# Patient Record
Sex: MALE
[De-identification: names, ages, dates, MRNs, and addresses within clinical notes are randomized per-mention and may not be internally consistent; named-entity substitution may affect disease eponyms.]

---

## 2018-08-21 ENCOUNTER — HOSPITAL ENCOUNTER (INPATIENT)
Dept: HOSPITAL 88 - FSED | Age: 38
LOS: 6 days | Discharge: HOME | DRG: 389 | End: 2018-08-27
Attending: INTERNAL MEDICINE | Admitting: INTERNAL MEDICINE
Payer: COMMERCIAL

## 2018-08-21 VITALS — SYSTOLIC BLOOD PRESSURE: 116 MMHG | DIASTOLIC BLOOD PRESSURE: 73 MMHG

## 2018-08-21 VITALS — WEIGHT: 101.13 LBS | HEIGHT: 64 IN | BODY MASS INDEX: 17.26 KG/M2

## 2018-08-21 VITALS — SYSTOLIC BLOOD PRESSURE: 114 MMHG | DIASTOLIC BLOOD PRESSURE: 72 MMHG

## 2018-08-21 VITALS — DIASTOLIC BLOOD PRESSURE: 72 MMHG | SYSTOLIC BLOOD PRESSURE: 114 MMHG

## 2018-08-21 DIAGNOSIS — K59.00: ICD-10-CM

## 2018-08-21 DIAGNOSIS — F06.8: ICD-10-CM

## 2018-08-21 DIAGNOSIS — D50.9: ICD-10-CM

## 2018-08-21 DIAGNOSIS — D63.8: ICD-10-CM

## 2018-08-21 DIAGNOSIS — F84.0: ICD-10-CM

## 2018-08-21 DIAGNOSIS — K56.699: Primary | ICD-10-CM

## 2018-08-21 DIAGNOSIS — K56.7: ICD-10-CM

## 2018-08-21 DIAGNOSIS — R50.9: ICD-10-CM

## 2018-08-21 DIAGNOSIS — G80.9: ICD-10-CM

## 2018-08-21 DIAGNOSIS — R13.10: ICD-10-CM

## 2018-08-21 DIAGNOSIS — G40.909: ICD-10-CM

## 2018-08-21 PROCEDURE — 71045 X-RAY EXAM CHEST 1 VIEW: CPT

## 2018-08-21 PROCEDURE — 87040 BLOOD CULTURE FOR BACTERIA: CPT

## 2018-08-21 PROCEDURE — 82150 ASSAY OF AMYLASE: CPT

## 2018-08-21 PROCEDURE — 82948 REAGENT STRIP/BLOOD GLUCOSE: CPT

## 2018-08-21 PROCEDURE — 74018 RADEX ABDOMEN 1 VIEW: CPT

## 2018-08-21 PROCEDURE — 80053 COMPREHEN METABOLIC PANEL: CPT

## 2018-08-21 PROCEDURE — 36415 COLL VENOUS BLD VENIPUNCTURE: CPT

## 2018-08-21 PROCEDURE — 82607 VITAMIN B-12: CPT

## 2018-08-21 PROCEDURE — 80164 ASSAY DIPROPYLACETIC ACD TOT: CPT

## 2018-08-21 PROCEDURE — 99284 EMERGENCY DEPT VISIT MOD MDM: CPT

## 2018-08-21 PROCEDURE — 82746 ASSAY OF FOLIC ACID SERUM: CPT

## 2018-08-21 PROCEDURE — 80048 BASIC METABOLIC PNL TOTAL CA: CPT

## 2018-08-21 PROCEDURE — 81003 URINALYSIS AUTO W/O SCOPE: CPT

## 2018-08-21 PROCEDURE — 86256 FLUORESCENT ANTIBODY TITER: CPT

## 2018-08-21 PROCEDURE — 82728 ASSAY OF FERRITIN: CPT

## 2018-08-21 PROCEDURE — 83540 ASSAY OF IRON: CPT

## 2018-08-21 PROCEDURE — 83690 ASSAY OF LIPASE: CPT

## 2018-08-21 PROCEDURE — 85025 COMPLETE CBC W/AUTO DIFF WBC: CPT

## 2018-08-21 PROCEDURE — 74176 CT ABD & PELVIS W/O CONTRAST: CPT

## 2018-08-21 PROCEDURE — 83735 ASSAY OF MAGNESIUM: CPT

## 2018-08-21 PROCEDURE — 84466 ASSAY OF TRANSFERRIN: CPT

## 2018-08-21 PROCEDURE — 83516 IMMUNOASSAY NONANTIBODY: CPT

## 2018-08-21 PROCEDURE — 85045 AUTOMATED RETICULOCYTE COUNT: CPT

## 2018-08-21 PROCEDURE — 82784 ASSAY IGA/IGD/IGG/IGM EACH: CPT

## 2018-08-21 RX ADMIN — SODIUM CHLORIDE SCH MLS/HR: 9 INJECTION, SOLUTION INTRAVENOUS at 15:00

## 2018-08-21 RX ADMIN — SODIUM CHLORIDE SCH MLS/HR: 9 INJECTION, SOLUTION INTRAVENOUS at 17:24

## 2018-08-21 NOTE — DIAGNOSTIC IMAGING REPORT
Examination: Single AP view of the chest.



COMPARISON: None.



INDICATION: Cerebral palsy patient with abnormal behavior

     

DISCUSSION:



The lungs are reasonably well inflated. No focal airspace consolidation,

pleural effusion, or pneumothorax. Heart size is normal when accounting for AP

technique and reverse lordotic positioning. The esophagus is dilated and

air-filled.



Partially visualized abdomen shows a massively distended, air-filled stomach

with multiple air-filled loops of small and large bowel. No acute osseous

abnormality.



IMPRESSION:

 

No acute cardiopulmonary abnormality.



Distended, air-filled esophagus and partially visualized infradiaphragmatic

bowel likely reflective of ileus.



Signed by: Dr. Rudy Villar M.D. on 8/21/2018 12:52 PM

## 2018-08-21 NOTE — DIAGNOSTIC IMAGING REPORT
EXAMINATION: CT of the abdomen and pelvis without contrast.

 

TECHNIQUE: Spiral CT images of the abdomen and pelvis were performed from the

lung bases to the lesser trochanters.  No intravenous contrast was given per

physician's request.  Coronal and sagittal reformatted images were obtained.



COMPARISON: KUB 8/21/2018



CLINICAL HISTORY:Abdominal distention, history of cerebral palsy

     

DISCUSSION: ABSENCE OF INTRAVENOUS CONTRAST DECREASES SENSITIVITY FOR DETECTION

OF FOCAL LESIONS AND VASCULAR PATHOLOGY.



ABDOMEN/PELVIS:



LOWER THORAX: Lung bases are clear. 



HEPATOBILIARY: No focal hepatic lesions.  No intra or extrahepatic biliary

ductal dilation.



GALLBLADDER: No radio-opaque stones or sludge.  No wall thickening.



SPLEEN: No splenomegaly.



PANCREAS: No focal masses or ductal dilatation.



ADRENALS: No adrenal nodules.



KIDNEYS/URETERS: No hydronephrosis, stones, contour abnormalities . Mild left

pelviectasis. Left extrarenal pelvis.



PELVIC ORGANS/BLADDER: Bladder is grossly unremarkable, without focal lesions

or wall thickening.



PERITONEUM/RETROPERITONEUM: No free air or fluid.



LYMPH NODES: No intra-abdominal,retroperitoneal, pelvic or inguinal

lymphadenopathy.



VESSELS: Unremarkable for noncontrast exam.



GI TRACT: Diffuse gaseous distention of the small and large bowel, with a few

dilated loops of mid to distal ileum, measuring 4.5 cm in diameter (series 2,

image 48). Other loops of small bowel bowel have normal caliber, without a

definite transition point. The large bowel is normal in caliber.

Prominent retained stool in the distal sigmoid extending to the rectum which

appears hardened (for example series 2, images 71-78).

No wall thickening.

Stomach is moderately distended.



BONES AND SOFT TISSUES: No aggressive lytic lesions. Mild leftward curvature of

the lumbosacral spine. Generalized osteopenia. Soft tissues are grossly

unremarkable.



IMPRESSION: 

1. Diffuse gaseous distention of the small or large bowel. There are a few

dilated loops of mid to distal ileum which measure 4.5 cm in diameter. Other

loops of small bowel are normal in caliber, without a definite transition

point. The large bowel is normal in caliber. Given the prominent retained stool

in the distal sigmoid extending to the rectum, findings are suggestive of mild

obstruction secondary to impaction. No pneumoperitoneum.



Signed by: Dr. Timothy Doe M.D. on 8/21/2018 2:26 PM

## 2018-08-21 NOTE — DIAGNOSTIC IMAGING REPORT
Exam: Abdominal film 



Clinical History: Cerebral palsy, patient not acting right



Comparison: None.



DISCUSSION: Frontal view of the abdomen shows diffuse gaseous distention of the

small or large bowel. There are several mildly dilated loops of small bowel in

the central abdomen which measure approximately 4.0 cm.

There is small amount of air is noted in the rectum, with presence of prominent

stool.

No abnormal calcifications.

No definite pneumoperitoneum.

Generalized osteopenia. Degenerative changes seen in bilateral hip joints and

lumbosacral spine, with leftward curvature.



IMPRESSION:



1. Diffuse gaseous distention of the small and large bowel. Several mildly

dilated loops of small bowel are noted in the central abdomen. Small amount of

air is noted in the rectum, with presence of prominent stool. This may be

represent obstruction secondary to impaction



The staff physician below has personally reviewed this exam on the date of

dictation.













Signed by: Dr. Timothy Doe M.D. on 8/21/2018 12:56 PM

## 2018-08-22 VITALS — DIASTOLIC BLOOD PRESSURE: 67 MMHG | SYSTOLIC BLOOD PRESSURE: 119 MMHG

## 2018-08-22 VITALS — SYSTOLIC BLOOD PRESSURE: 119 MMHG | DIASTOLIC BLOOD PRESSURE: 67 MMHG

## 2018-08-22 VITALS — DIASTOLIC BLOOD PRESSURE: 74 MMHG | SYSTOLIC BLOOD PRESSURE: 118 MMHG

## 2018-08-22 VITALS — SYSTOLIC BLOOD PRESSURE: 118 MMHG | DIASTOLIC BLOOD PRESSURE: 74 MMHG

## 2018-08-22 VITALS — SYSTOLIC BLOOD PRESSURE: 137 MMHG | DIASTOLIC BLOOD PRESSURE: 93 MMHG

## 2018-08-22 VITALS — DIASTOLIC BLOOD PRESSURE: 57 MMHG | SYSTOLIC BLOOD PRESSURE: 100 MMHG

## 2018-08-22 VITALS — SYSTOLIC BLOOD PRESSURE: 123 MMHG | DIASTOLIC BLOOD PRESSURE: 60 MMHG

## 2018-08-22 LAB
ALBUMIN SERPL-MCNC: 3.3 G/DL (ref 3.5–5)
ALBUMIN/GLOB SERPL: 1.1 {RATIO} (ref 0.8–2)
ALP SERPL-CCNC: 70 IU/L (ref 40–150)
ALT SERPL-CCNC: 12 IU/L (ref 0–55)
ANION GAP SERPL CALC-SCNC: 13.7 MMOL/L (ref 8–16)
BASOPHILS # BLD AUTO: 0 10*3/UL (ref 0–0.1)
BASOPHILS NFR BLD AUTO: 0.4 % (ref 0–1)
BUN SERPL-MCNC: 5 MG/DL (ref 7–26)
BUN/CREAT SERPL: 8 (ref 6–25)
CALCIUM SERPL-MCNC: 9 MG/DL (ref 8.4–10.2)
CHLORIDE SERPL-SCNC: 108 MMOL/L (ref 98–107)
CO2 SERPL-SCNC: 22 MMOL/L (ref 22–29)
DEPRECATED NEUTROPHILS # BLD AUTO: 6.1 10*3/UL (ref 2.1–6.9)
EGFRCR SERPLBLD CKD-EPI 2021: > 60 ML/MIN (ref 60–?)
EOSINOPHIL # BLD AUTO: 0.1 10*3/UL (ref 0–0.4)
EOSINOPHIL NFR BLD AUTO: 0.5 % (ref 0–6)
ERYTHROCYTE [DISTWIDTH] IN CORD BLOOD: 11.8 % (ref 11.7–14.4)
GLOBULIN PLAS-MCNC: 3 G/DL (ref 2.3–3.5)
GLUCOSE SERPLBLD-MCNC: 102 MG/DL (ref 74–118)
HCT VFR BLD AUTO: 33.8 % (ref 38.2–49.6)
HGB BLD-MCNC: 11.6 G/DL (ref 14–18)
LYMPHOCYTES # BLD: 3 10*3/UL (ref 1–3.2)
LYMPHOCYTES NFR BLD AUTO: 28.8 % (ref 18–39.1)
MCH RBC QN AUTO: 32.1 PG (ref 28–32)
MCHC RBC AUTO-ENTMCNC: 34.3 G/DL (ref 31–35)
MCV RBC AUTO: 93.6 FL (ref 81–99)
MONOCYTES # BLD AUTO: 1.2 10*3/UL (ref 0.2–0.8)
MONOCYTES NFR BLD AUTO: 11.5 % (ref 4.4–11.3)
NEUTS SEG NFR BLD AUTO: 58.6 % (ref 38.7–80)
PLATELET # BLD AUTO: 264 X10E3/UL (ref 140–360)
POTASSIUM SERPL-SCNC: 3.7 MMOL/L (ref 3.5–5.1)
RBC # BLD AUTO: 3.61 X10E6/UL (ref 4.3–5.7)
SODIUM SERPL-SCNC: 140 MMOL/L (ref 136–145)

## 2018-08-22 RX ADMIN — SODIUM CHLORIDE SCH MLS/HR: 9 INJECTION, SOLUTION INTRAVENOUS at 05:52

## 2018-08-22 RX ADMIN — VALPROIC ACID SCH MG: 250 SOLUTION ORAL at 17:29

## 2018-08-22 RX ADMIN — RISPERIDONE SCH MG: 1 TABLET ORAL at 22:03

## 2018-08-22 RX ADMIN — NYSTATIN AND TRIAMCINOLONE ACETONIDE SCH GM: 100000; 1 CREAM TOPICAL at 17:29

## 2018-08-22 RX ADMIN — CEFOXITIN SCH GM: 1 INJECTION, POWDER, FOR SOLUTION INTRAVENOUS at 09:48

## 2018-08-22 RX ADMIN — DEXTROSE AND SODIUM CHLORIDE SCH MLS/HR: 5; 900 INJECTION, SOLUTION INTRAVENOUS at 12:10

## 2018-08-22 RX ADMIN — CEFOXITIN SCH GM: 1 INJECTION, POWDER, FOR SOLUTION INTRAVENOUS at 01:56

## 2018-08-22 RX ADMIN — Medication SCH MG: at 17:29

## 2018-08-22 RX ADMIN — CEFOXITIN SCH GM: 1 INJECTION, POWDER, FOR SOLUTION INTRAVENOUS at 17:30

## 2018-08-22 NOTE — DIAGNOSTIC IMAGING REPORT
PROCEDURE:X-RAY ABDOMEN - KUB

 

COMPARISON:KUB and CT Abdomen/Pelvis 8/21/18.

 

INDICATIONS:AUTISM, BOWEL OBSTRUCTION 

 

FINDINGS:

NG tube terminates in the expected location of the proximal duodenum. 

Air filled mildly distended small and large bowel loops are again 

noted. The degree of distension is similar to prior KUB on 8/21/18. 

Stool is again noted in the rectum. No evidence of free intraperitoneal 

air. 

 

Generalized osteopenia. Degenerative changes of the lower lumbar spine. 

 

 

CONCLUSION:

Unchanged distension of small and large bowel loops with stool in the 

rectum. Findings may represent mild obstruction secondary to rectal 

fecal impaction or non-obstructive ileus. 

 

 

 

 

Dictated by:  ARISTIDES EDGAR M.D. on 8/22/2018 at 12:13     

Electronically approved by:  ARISTIDES EDGAR M.D. on 8/22/2018 at 12:13

## 2018-08-22 NOTE — HISTORY AND PHYSICAL
HPI:  A 37-year-old male who has a past medical history positive for 

cerebral palsy, seizures, autism, psychosis, epilepsy, chronic constipation 

that came from the nursing home because of tremors and abdominal pain.  So, 

patient was found to have ileus.  NG tube was placed.  Patient was admitted 

to the hospital.



REVIEW OF SYSTEMS:  Patient is unable to communicate with me, so we cannot 

get any information.



PAST MEDICAL HISTORY:  As per nurses' notes, seizures, he is a nursing home 

resident.  He has history of cerebral palsy, autism, psychosis, epilepsy, 

chronic constipation, mental illness.



ALLERGIES:  HE IS NOT ALLERGIC TO ANY MEDICATION.



SOCIAL HISTORY:  He lives in a nursing home.  We do not know if he smokes 

or drinks.



PHYSICAL EXAMINATION

VITAL SIGNS:  Blood pressure 119/67, temperature 98.1, heart rate 80 per 

minute, respiratory rate is 20 per minute, oxygen saturation 96%. 

HEART:  Shows regular rate and rhythm.  Normal S1, S2 sounds.

LUNGS:  Clear bilaterally.

ABDOMEN:  Soft.

EXTREMITIES:  Show no evidence of cyanosis, edema, or trauma.  He has 

atrophy in upper and lower extremities.



LABORATORY DATA:  On the BMP, sodium 140, potassium 3.7, chloride 108, CO2 

of 22, BUN 55, creatinine 0.59, glucose 102.  On the CBC, white blood count 

10.4, hemoglobin 11.6, hematocrit 33.8, platelet count 264,000.  AST 13, 

ALT 12, total bilirubin 0.7, alkaline phosphatase 70.  CT of the abdomen 

showed ileus and a possible fecal impaction.  Blood culture has been sent.



FINAL IMPRESSION

1. Ileus. 

2. Fecal impaction.

3. Anemia of chronic disease.

4. Cerebral palsy.

5. Dysphagia.



PLAN OF TREATMENT:  We are going to continue NG tube to suction.  Continue 

IV fluids.  Continue Zofran 4 mg IV q.4 hours as needed.  Cefoxitin has 

been started at 1 gram IV q.8 hours.  Consult Dr. Patrick Oleary for 

gastroenterology, Dr. Calvin Pool for surgery, and we are going to 

repeat another KUB today.  Speech therapy has been ordered also, the 

patient has apparent difficulty swallowing.  He was on a pureed diet with 

thickened fluids.  We are going to order Fleet enema today and then go from 

there.









DD:  08/22/2018 11:07

DT:  08/22/2018 11:21

Job#:  [id number]

## 2018-08-23 VITALS — SYSTOLIC BLOOD PRESSURE: 116 MMHG | DIASTOLIC BLOOD PRESSURE: 68 MMHG

## 2018-08-23 VITALS — DIASTOLIC BLOOD PRESSURE: 80 MMHG | SYSTOLIC BLOOD PRESSURE: 120 MMHG

## 2018-08-23 VITALS — DIASTOLIC BLOOD PRESSURE: 73 MMHG | SYSTOLIC BLOOD PRESSURE: 96 MMHG

## 2018-08-23 VITALS — DIASTOLIC BLOOD PRESSURE: 65 MMHG | SYSTOLIC BLOOD PRESSURE: 144 MMHG

## 2018-08-23 VITALS — SYSTOLIC BLOOD PRESSURE: 103 MMHG | DIASTOLIC BLOOD PRESSURE: 78 MMHG

## 2018-08-23 VITALS — SYSTOLIC BLOOD PRESSURE: 121 MMHG | DIASTOLIC BLOOD PRESSURE: 94 MMHG

## 2018-08-23 LAB
ANION GAP SERPL CALC-SCNC: 15.3 MMOL/L (ref 8–16)
BUN SERPL-MCNC: < 5 MG/DL (ref 7–26)
BUN/CREAT SERPL: 8 (ref 6–25)
CALCIUM SERPL-MCNC: 9.2 MG/DL (ref 8.4–10.2)
CHLORIDE SERPL-SCNC: 108 MMOL/L (ref 98–107)
CO2 SERPL-SCNC: 24 MMOL/L (ref 22–29)
EGFRCR SERPLBLD CKD-EPI 2021: > 60 ML/MIN (ref 60–?)
FERRITIN SERPL-MCNC: 44.91 NG/ML (ref 21.81–274.66)
FOLATE SERPL-MCNC: 17.8 NG/ML (ref 7–15.4)
GLUCOSE SERPLBLD-MCNC: 115 MG/DL (ref 74–118)
IRON SATN MFR SERPL: 11 % (ref 15–50)
IRON SERPL-MCNC: 34 UG/DL (ref 65–175)
POTASSIUM SERPL-SCNC: 3.3 MMOL/L (ref 3.5–5.1)
SODIUM SERPL-SCNC: 144 MMOL/L (ref 136–145)
TIBC SERPL-MCNC: 307 UG/DL (ref 261–478)
TRANSFERRIN SERPL-MCNC: 219 MG/DL (ref 174–364)
VIT B12 BLD-MCNC: 551 PG/ML (ref 213–816)

## 2018-08-23 RX ADMIN — VALPROIC ACID SCH MG: 250 SOLUTION ORAL at 16:40

## 2018-08-23 RX ADMIN — DOCUSATE SODIUM LIQUID SCH MG: 100 LIQUID ORAL at 08:44

## 2018-08-23 RX ADMIN — CEFOXITIN SCH GM: 1 INJECTION, POWDER, FOR SOLUTION INTRAVENOUS at 02:11

## 2018-08-23 RX ADMIN — NYSTATIN AND TRIAMCINOLONE ACETONIDE SCH GM: 100000; 1 CREAM TOPICAL at 16:40

## 2018-08-23 RX ADMIN — DEXTROSE AND SODIUM CHLORIDE SCH MLS/HR: 5; 900 INJECTION, SOLUTION INTRAVENOUS at 00:25

## 2018-08-23 RX ADMIN — CEFOXITIN SCH GM: 1 INJECTION, POWDER, FOR SOLUTION INTRAVENOUS at 09:50

## 2018-08-23 RX ADMIN — Medication SCH UNIT: at 08:44

## 2018-08-23 RX ADMIN — RISPERIDONE SCH MG: 1 TABLET ORAL at 21:12

## 2018-08-23 RX ADMIN — VALPROIC ACID SCH MG: 250 SOLUTION ORAL at 08:44

## 2018-08-23 RX ADMIN — DEXTROSE AND SODIUM CHLORIDE SCH MLS/HR: 5; 900 INJECTION, SOLUTION INTRAVENOUS at 14:00

## 2018-08-23 RX ADMIN — CEFOXITIN SCH GM: 1 INJECTION, POWDER, FOR SOLUTION INTRAVENOUS at 18:32

## 2018-08-23 RX ADMIN — NYSTATIN AND TRIAMCINOLONE ACETONIDE SCH GM: 100000; 1 CREAM TOPICAL at 08:48

## 2018-08-23 RX ADMIN — Medication SCH MG: at 08:44

## 2018-08-23 RX ADMIN — Medication SCH MG: at 16:40

## 2018-08-23 NOTE — PROGRESS NOTE
DATE:    



INTERNAL MEDICINE PROGRESS NOTE



SUBJECTIVE:  This 29-year-old  male came here with abdominal 

distention.  The abdomen is not as distended as before.  He was found to 

have an ileus.  The patient is on an NG tube with suctioning.  



PHYSICAL EXAMINATION 

VITAL SIGNS:  Blood pressure 120/80.  Temperature 98.8.  Heart rate 82 per 

minute.  Respiratory rate 18 per minute.  Oxygen saturation 98%.  

HEART:  Regular rhythm.  Normal S1, S2 sounds. 

LUNGS:  Clear bilaterally. 

ABDOMEN:  Soft. 

EXTREMITIES:  No evidence of cyanosis, edema or trauma.  



LABS:  On the BMP, sodium 144, potassium 3.3, chloride 108, CO2 24, BUN 5, 

creatinine 0.61, glucose 115.  On the CBC, white blood count 10.4, 

hemoglobin 11.6, hematocrit 33.8, platelet count 264,000.  AST 13, ALT 12, 

total bilirubin 0.7, alkaline phosphatase 70. 



FINAL IMPRESSION

1. Ileus.

2. Fecal impaction, which is slowly resolving.

3. Anemia of chronic disease.

4. Cerebral palsy.

5. Dysphagia.



PLAN OF TREATMENT

1. Continue IV fluids at 80 mL an hour.  

2. Continue Zofran 4 mg IV q.4 h. as needed.  

3. Cefoxitin 1 gram IV q.8 h. for the concern about sepsis on admission, 

but he does not seem to be septic, and blood cultures are negative.  

4. Continue cholecalciferol 400 units daily.  

5. Benztropine 1 mg twice a day.  

6. Nystatin and triamcinolone cream twice a day.  

7. Risperdal 2 mg at bedtime.  

8. Valproic acid 250 mg twice a day.  

9. Colace 50 mg daily.  

10. Dr. Patrick Oleary has been consulted from the gastroenterology 

point of view and Dr. Calvin Pool from the surgical point of view.







DD:  08/23/2018 17:49

DT:  08/23/2018 18:08

Job#:  [id number]

## 2018-08-23 NOTE — DIAGNOSTIC IMAGING REPORT
ABDOMEN-1VIEW (KUB)



Clinical history: Ileus

Technique: AP view abdomen

Comparison: 8/21/2018



Findings:

NG tube tip seen projecting over the right upper quadrant. Hemidiaphragms are

excluded from view. Interval decrease in gaseous distention of small bowel. Gas

is predominantly seen throughout slightly prominent colon; resolved rectal

stool burden. Unchanged bones.



Impression:

Improved ileus. 



Signed by: Dr Elsa Walker MD on 8/23/2018 6:11 AM

## 2018-08-24 VITALS — SYSTOLIC BLOOD PRESSURE: 98 MMHG | DIASTOLIC BLOOD PRESSURE: 52 MMHG

## 2018-08-24 VITALS — DIASTOLIC BLOOD PRESSURE: 71 MMHG | SYSTOLIC BLOOD PRESSURE: 106 MMHG

## 2018-08-24 VITALS — SYSTOLIC BLOOD PRESSURE: 108 MMHG | DIASTOLIC BLOOD PRESSURE: 82 MMHG

## 2018-08-24 VITALS — SYSTOLIC BLOOD PRESSURE: 104 MMHG | DIASTOLIC BLOOD PRESSURE: 87 MMHG

## 2018-08-24 VITALS — DIASTOLIC BLOOD PRESSURE: 71 MMHG | SYSTOLIC BLOOD PRESSURE: 109 MMHG

## 2018-08-24 VITALS — SYSTOLIC BLOOD PRESSURE: 116 MMHG | DIASTOLIC BLOOD PRESSURE: 85 MMHG

## 2018-08-24 VITALS — SYSTOLIC BLOOD PRESSURE: 109 MMHG | DIASTOLIC BLOOD PRESSURE: 71 MMHG

## 2018-08-24 LAB
AMYLASE SERPL-CCNC: 43 U/L (ref 25–125)
ANION GAP SERPL CALC-SCNC: 12.9 MMOL/L (ref 8–16)
BUN SERPL-MCNC: < 5 MG/DL (ref 7–26)
BUN/CREAT SERPL: 8 (ref 6–25)
CALCIUM SERPL-MCNC: 9.5 MG/DL (ref 8.4–10.2)
CHLORIDE SERPL-SCNC: 107 MMOL/L (ref 98–107)
CO2 SERPL-SCNC: 24 MMOL/L (ref 22–29)
EGFRCR SERPLBLD CKD-EPI 2021: > 60 ML/MIN (ref 60–?)
GLUCOSE SERPLBLD-MCNC: 110 MG/DL (ref 74–118)
LIPASE SERPL-CCNC: 9 U/L (ref 8–78)
POTASSIUM SERPL-SCNC: 3.9 MMOL/L (ref 3.5–5.1)
SODIUM SERPL-SCNC: 140 MMOL/L (ref 136–145)

## 2018-08-24 RX ADMIN — NYSTATIN AND TRIAMCINOLONE ACETONIDE SCH GM: 100000; 1 CREAM TOPICAL at 09:00

## 2018-08-24 RX ADMIN — Medication SCH MG: at 17:00

## 2018-08-24 RX ADMIN — NYSTATIN AND TRIAMCINOLONE ACETONIDE SCH GM: 100000; 1 CREAM TOPICAL at 17:00

## 2018-08-24 RX ADMIN — CEFOXITIN SCH GM: 1 INJECTION, POWDER, FOR SOLUTION INTRAVENOUS at 09:00

## 2018-08-24 RX ADMIN — DEXTROSE AND SODIUM CHLORIDE SCH MLS/HR: 5; 900 INJECTION, SOLUTION INTRAVENOUS at 06:37

## 2018-08-24 RX ADMIN — DOCUSATE SODIUM LIQUID SCH MG: 100 LIQUID ORAL at 09:00

## 2018-08-24 RX ADMIN — VALPROIC ACID SCH MG: 250 SOLUTION ORAL at 09:00

## 2018-08-24 RX ADMIN — VALPROIC ACID SCH MG: 250 SOLUTION ORAL at 17:00

## 2018-08-24 RX ADMIN — DEXTROSE AND SODIUM CHLORIDE SCH MLS/HR: 5; 900 INJECTION, SOLUTION INTRAVENOUS at 00:45

## 2018-08-24 RX ADMIN — RISPERIDONE SCH MG: 1 TABLET ORAL at 21:00

## 2018-08-24 RX ADMIN — Medication SCH MG: at 09:00

## 2018-08-24 RX ADMIN — Medication SCH UNIT: at 09:00

## 2018-08-24 RX ADMIN — DEXTROSE AND SODIUM CHLORIDE SCH MLS/HR: 5; 900 INJECTION, SOLUTION INTRAVENOUS at 06:40

## 2018-08-24 RX ADMIN — CEFOXITIN SCH GM: 1 INJECTION, POWDER, FOR SOLUTION INTRAVENOUS at 01:52

## 2018-08-24 NOTE — DIAGNOSTIC IMAGING REPORT
ABDOMEN-1VIEW (KUB)



Clinical history: Ileus

Technique: AP view abdomen

Comparison: Previous day



Findings:

Continued decrease in gaseous distention of bowel. Gas is predominantly seen

throughout nondilated colon. Partially visualized NG tube of the right upper

abdomen. Hemidiaphragms are excluded.



Impression:

Continued decrease in gas-filled loops of bowel. 



Signed by: Dr Elsa Walker MD on 8/24/2018 5:29 AM

## 2018-08-25 VITALS — DIASTOLIC BLOOD PRESSURE: 64 MMHG | SYSTOLIC BLOOD PRESSURE: 126 MMHG

## 2018-08-25 VITALS — SYSTOLIC BLOOD PRESSURE: 113 MMHG | DIASTOLIC BLOOD PRESSURE: 71 MMHG

## 2018-08-25 VITALS — SYSTOLIC BLOOD PRESSURE: 105 MMHG | DIASTOLIC BLOOD PRESSURE: 71 MMHG

## 2018-08-25 VITALS — DIASTOLIC BLOOD PRESSURE: 85 MMHG | SYSTOLIC BLOOD PRESSURE: 172 MMHG

## 2018-08-25 VITALS — SYSTOLIC BLOOD PRESSURE: 107 MMHG | DIASTOLIC BLOOD PRESSURE: 70 MMHG

## 2018-08-25 VITALS — SYSTOLIC BLOOD PRESSURE: 105 MMHG | DIASTOLIC BLOOD PRESSURE: 65 MMHG

## 2018-08-25 RX ADMIN — Medication SCH EA: at 10:14

## 2018-08-25 RX ADMIN — NYSTATIN AND TRIAMCINOLONE ACETONIDE SCH GM: 100000; 1 CREAM TOPICAL at 11:28

## 2018-08-25 RX ADMIN — RISPERIDONE SCH MG: 1 TABLET ORAL at 21:22

## 2018-08-25 RX ADMIN — VALPROIC ACID SCH MG: 250 SOLUTION ORAL at 17:30

## 2018-08-25 RX ADMIN — NYSTATIN AND TRIAMCINOLONE ACETONIDE SCH GM: 100000; 1 CREAM TOPICAL at 17:00

## 2018-08-25 RX ADMIN — Medication SCH EA: at 17:30

## 2018-08-25 RX ADMIN — Medication SCH UNIT: at 10:14

## 2018-08-25 RX ADMIN — Medication SCH MG: at 17:30

## 2018-08-25 RX ADMIN — DEXTROSE AND SODIUM CHLORIDE SCH MLS/HR: 5; 900 INJECTION, SOLUTION INTRAVENOUS at 14:34

## 2018-08-25 RX ADMIN — DOCUSATE SODIUM LIQUID SCH MG: 100 LIQUID ORAL at 10:14

## 2018-08-25 RX ADMIN — VALPROIC ACID SCH MG: 250 SOLUTION ORAL at 10:14

## 2018-08-25 RX ADMIN — Medication SCH MG: at 10:14

## 2018-08-25 RX ADMIN — DEXTROSE AND SODIUM CHLORIDE SCH MLS/HR: 5; 900 INJECTION, SOLUTION INTRAVENOUS at 06:04

## 2018-08-26 VITALS — DIASTOLIC BLOOD PRESSURE: 78 MMHG | SYSTOLIC BLOOD PRESSURE: 118 MMHG

## 2018-08-26 VITALS — SYSTOLIC BLOOD PRESSURE: 122 MMHG | DIASTOLIC BLOOD PRESSURE: 79 MMHG

## 2018-08-26 VITALS — SYSTOLIC BLOOD PRESSURE: 115 MMHG | DIASTOLIC BLOOD PRESSURE: 73 MMHG

## 2018-08-26 VITALS — SYSTOLIC BLOOD PRESSURE: 118 MMHG | DIASTOLIC BLOOD PRESSURE: 76 MMHG

## 2018-08-26 VITALS — DIASTOLIC BLOOD PRESSURE: 70 MMHG | SYSTOLIC BLOOD PRESSURE: 116 MMHG

## 2018-08-26 VITALS — SYSTOLIC BLOOD PRESSURE: 118 MMHG | DIASTOLIC BLOOD PRESSURE: 78 MMHG

## 2018-08-26 VITALS — DIASTOLIC BLOOD PRESSURE: 79 MMHG | SYSTOLIC BLOOD PRESSURE: 134 MMHG

## 2018-08-26 VITALS — DIASTOLIC BLOOD PRESSURE: 79 MMHG | SYSTOLIC BLOOD PRESSURE: 122 MMHG

## 2018-08-26 RX ADMIN — Medication SCH MG: at 17:11

## 2018-08-26 RX ADMIN — DEXTROSE AND SODIUM CHLORIDE SCH MLS/HR: 5; 900 INJECTION, SOLUTION INTRAVENOUS at 21:38

## 2018-08-26 RX ADMIN — NYSTATIN AND TRIAMCINOLONE ACETONIDE SCH GM: 100000; 1 CREAM TOPICAL at 10:00

## 2018-08-26 RX ADMIN — Medication SCH EA: at 17:11

## 2018-08-26 RX ADMIN — DEXTROSE AND SODIUM CHLORIDE SCH MLS/HR: 5; 900 INJECTION, SOLUTION INTRAVENOUS at 03:33

## 2018-08-26 RX ADMIN — Medication SCH EA: at 10:00

## 2018-08-26 RX ADMIN — RISPERIDONE SCH MG: 1 TABLET ORAL at 21:38

## 2018-08-26 RX ADMIN — NYSTATIN AND TRIAMCINOLONE ACETONIDE SCH GM: 100000; 1 CREAM TOPICAL at 17:11

## 2018-08-26 RX ADMIN — Medication SCH UNIT: at 10:00

## 2018-08-26 RX ADMIN — VALPROIC ACID SCH MG: 250 SOLUTION ORAL at 17:11

## 2018-08-26 RX ADMIN — DOCUSATE SODIUM LIQUID SCH MG: 100 LIQUID ORAL at 10:00

## 2018-08-26 RX ADMIN — DEXTROSE AND SODIUM CHLORIDE SCH MLS/HR: 5; 900 INJECTION, SOLUTION INTRAVENOUS at 15:15

## 2018-08-26 RX ADMIN — Medication SCH MG: at 10:00

## 2018-08-26 RX ADMIN — VALPROIC ACID SCH MG: 250 SOLUTION ORAL at 10:00

## 2018-08-26 NOTE — PROGRESS NOTE
DATE:    



INTERNAL MEDICINE PROGRESS NOTE



SUBJECTIVE:  Patient is doing well.  No significant complaint, no more 

vomiting.  Tolerating a diet.  He is going to back to the nursing home 

tomorrow.



PHYSICAL EXAM 

VITAL SIGNS:  Blood pressure 118/78.  Temperature 88.6.  Heart rate 86 per 

minute.  Respiratory rate 18 per minute.  Oxygen saturation 100%.   

HEART:  Regular rhythm.  Normal S1, S2 sounds. 

LUNGS:  Clear bilaterally. 

ABDOMEN:  Soft. 

EXTREMITIES:  Show no evidence of cyanosis or trauma, but he has atrophy in 

upper and lower extremities.  



On the BMP sodium 140, potassium 3.9, chloride 107, CO2 24, BUN 5, 

creatinine 0.59, glucose of 110.  On the CBC, white blood count 10.4, 

hemoglobin 11.6, hematocrit 33.8, platelet count 264,000.  AST 13, ALT 12, 

total bilirubin 0.7, alkaline phosphatase 70.   



FINAL IMPRESSIONS

1. Ileus, which is resolved.  

2. Fecal impaction, which is resolved.  

3. Cerebral palsy. 

4. Mental retardation.  

5. Iron deficiency anemia.



PLAN OF TREATMENT

1. We are going to discontinue IV fluids.  

2. Continue Zofran 4 mg IV q.4 h as needed.  

3. Benztropine mesylate 1 mg twice a day.  

4. Risperdal 2 mg at bedtime.  

5. Valproic acid 250 mg twice a day.  

6. Tylenol 625 mg q.4 h as needed.  

7. Colace 60 mg daily. 

8. Ferrous fumarate combined with folic acid and vitamin B complex 1 

tablet twice a day. 

9. Cholecalciferol 400 units daily.  

10. Patient going to a nursing home tomorrow.  











DD:  08/26/2018 14:11

DT:  08/26/2018 19:26

Job#:  [id number]

## 2018-08-27 VITALS — SYSTOLIC BLOOD PRESSURE: 119 MMHG | DIASTOLIC BLOOD PRESSURE: 82 MMHG

## 2018-08-27 VITALS — SYSTOLIC BLOOD PRESSURE: 104 MMHG | DIASTOLIC BLOOD PRESSURE: 55 MMHG

## 2018-08-27 VITALS — SYSTOLIC BLOOD PRESSURE: 122 MMHG | DIASTOLIC BLOOD PRESSURE: 65 MMHG

## 2018-08-27 VITALS — DIASTOLIC BLOOD PRESSURE: 55 MMHG | SYSTOLIC BLOOD PRESSURE: 104 MMHG

## 2018-08-27 VITALS — DIASTOLIC BLOOD PRESSURE: 81 MMHG | SYSTOLIC BLOOD PRESSURE: 130 MMHG

## 2018-08-27 RX ADMIN — VALPROIC ACID SCH MG: 250 SOLUTION ORAL at 08:42

## 2018-08-27 RX ADMIN — Medication SCH EA: at 08:42

## 2018-08-27 RX ADMIN — DOCUSATE SODIUM LIQUID SCH MG: 100 LIQUID ORAL at 08:42

## 2018-08-27 RX ADMIN — Medication SCH UNIT: at 08:42

## 2018-08-27 RX ADMIN — NYSTATIN AND TRIAMCINOLONE ACETONIDE SCH GM: 100000; 1 CREAM TOPICAL at 08:45

## 2018-08-27 RX ADMIN — Medication SCH MG: at 08:42

## 2018-08-27 NOTE — PROGRESS NOTE
DATE:    



NO DICTATION, LENGTH 4 SECONDS. 









DD:  08/27/2018 12:07

DT:  08/27/2018 12:13

Job#:  [id number]

## 2018-08-27 NOTE — DISCHARGE SUMMARY
HISTORY OF PRESENT ILLNESS:  Patient is a 37-year-old  male who 

lives in an assisted living place who had a history of mental retardation 

and cerebral palsy, came here with ileus.  Ileus resolved.  He was 

initially with an NG tube.  NG tube was removed and the diet was slowly 

upgraded.  Patient doing fine today.  He had a fecal impaction which was 

removed.



PHYSICAL EXAM  

VITAL SIGNS:  Blood pressure 104/55, temperature 101.2, heart rate 120 per 

minute, respiratory rate 18 per minute, oxygen saturation 96%.  

HEART:  Shows regular rhythm.  Normal S1, S2 sounds.  

LUNGS:  Clear bilaterally.  

ABDOMEN:  Soft.  



On the BMP, sodium 140, potassium 3.9, chloride 107, CO2 24, BUN 5, 

creatinine 0.59, glucose 110.  On the CBC, white blood count 10,400, 

hemoglobin 11.6, hematocrit 33.8, platelet count 264,000.  AST 13, ALT 12, 

total bilirubin 0.7, alkaline phos 70.  



FINAL IMPRESSIONS 

1. Ileus, which is resolved.  

2. Fecal impaction, which is resolved.  

3. Anemia, which is resolved.  

4. Fever today, which we are going to investigate source of fever.  



We are going to order a chest x-ray, blood culture, urine culture, and then 

infectious disease consult with Dr. Falk.  We are going to hold on on the 

discharge for now.



 _________________________________

KAITLYN GRACIA MD



DD:  08/27/2018 12:08

DT:  08/27/2018 13:12                                        Job#:  [id number] 

TA

## 2018-09-17 ENCOUNTER — HOSPITAL ENCOUNTER (EMERGENCY)
Dept: HOSPITAL 88 - FSED | Age: 38
Discharge: HOME | End: 2018-09-17
Payer: COMMERCIAL

## 2018-09-17 VITALS — WEIGHT: 99.5 LBS | HEIGHT: 63 IN | BODY MASS INDEX: 17.63 KG/M2

## 2018-09-17 DIAGNOSIS — R11.2: Primary | ICD-10-CM

## 2018-09-17 PROCEDURE — 99283 EMERGENCY DEPT VISIT LOW MDM: CPT

## 2019-01-24 NOTE — DIAGNOSTIC IMAGING REPORT
Examination: Single AP view of the chest.



COMPARISON: Chest one view 8/21/2018, CT abdomen and pelvis 8/21/2018



INDICATION: Suspected aspiration

    

IMPRESSION:

 

1.  Lines and Tubes: Enteric tube in place, with distal tip coiled in the

stomach body.

2.  Lungs are mildly hypoinflated but grossly clear, without consolidation or

effusion.

3.  Cardiomediastinal silhouette is normal when accounting for AP technique.  

Pulmonary vasculature is normal.

4.  No acute bony abnormalities.

5. Gaseous distention of the small and large bowel is again noted.



Signed by: Dr. Timothy Doe M.D. on 8/21/2018 5:17 PM lab results/radiology results
